# Patient Record
Sex: MALE | Race: OTHER | HISPANIC OR LATINO | URBAN - METROPOLITAN AREA
[De-identification: names, ages, dates, MRNs, and addresses within clinical notes are randomized per-mention and may not be internally consistent; named-entity substitution may affect disease eponyms.]

---

## 2022-08-24 ENCOUNTER — APPOINTMENT (RX ONLY)
Dept: URBAN - METROPOLITAN AREA CLINIC 15 | Facility: CLINIC | Age: 32
Setting detail: DERMATOLOGY
End: 2022-08-24

## 2022-08-24 VITALS — WEIGHT: 280 LBS | HEIGHT: 70 IN

## 2022-08-24 DIAGNOSIS — L30.1 DYSHIDROSIS [POMPHOLYX]: ICD-10-CM

## 2022-08-24 DIAGNOSIS — B35.3 TINEA PEDIS: ICD-10-CM

## 2022-08-24 DIAGNOSIS — L64.8 OTHER ANDROGENIC ALOPECIA: ICD-10-CM

## 2022-08-24 DIAGNOSIS — L30.8 OTHER SPECIFIED DERMATITIS: ICD-10-CM

## 2022-08-24 DIAGNOSIS — L65.0 TELOGEN EFFLUVIUM: ICD-10-CM

## 2022-08-24 PROCEDURE — ? PRESCRIPTION

## 2022-08-24 PROCEDURE — 99204 OFFICE O/P NEW MOD 45 MIN: CPT

## 2022-08-24 PROCEDURE — ? COUNSELING

## 2022-08-24 PROCEDURE — ? PRESCRIPTION MEDICATION MANAGEMENT

## 2022-08-24 RX ORDER — NAFTIFINE HYDROCHLORIDE 2 G/100G
GEL TOPICAL BID
Qty: 60 | Refills: 2 | Status: ERX | COMMUNITY
Start: 2022-08-24

## 2022-08-24 RX ORDER — BETAMETHASONE DIPROPIONATE 0.5 MG/G
1 CREAM TOPICAL AS DIRECTED
Qty: 67.5 | Refills: 0 | Status: ERX | COMMUNITY
Start: 2022-08-24

## 2022-08-24 RX ADMIN — BETAMETHASONE DIPROPIONATE 1: 0.5 CREAM TOPICAL at 00:00

## 2022-08-24 RX ADMIN — NAFTIFINE HYDROCHLORIDE: 2 GEL TOPICAL at 00:00

## 2022-08-24 ASSESSMENT — LOCATION DETAILED DESCRIPTION DERM
LOCATION DETAILED: RIGHT RADIAL PALM
LOCATION DETAILED: LEFT RADIAL PALM

## 2022-08-24 ASSESSMENT — LOCATION SIMPLE DESCRIPTION DERM
LOCATION SIMPLE: RIGHT HAND
LOCATION SIMPLE: LEFT HAND

## 2022-08-24 ASSESSMENT — LOCATION ZONE DERM: LOCATION ZONE: HAND

## 2022-08-24 NOTE — PROCEDURE: COUNSELING
Moisturizer Recommendations: Christy Melo from [employer]
Topical Steroid Recommendations: Diprolene ointment
Detail Level: Zone
Topical Steroid Recommendations: Triamcinolone 0.01% cream
Cleanser Recommendations: Vanicream brand
Moisturizer Recommendations: Cerave hand cream
Topical Antifungal Recommendations: Ketoconazole 2% cream
Minoxidil 5% Topical Foam Recommendations: Apply to scalp once a day
Minoxidil Recommendations: Rogaine 5% solution

## 2022-08-24 NOTE — PROCEDURE: PRESCRIPTION MEDICATION MANAGEMENT
Detail Level: Simple
Render In Strict Bullet Format?: No
Initiate Treatment: .\\n*Avoid excessive hand washing \\n*Use soap and water instead of sanitizers. Use Vanicream gentle cleanser \\n*Use moisturizer cream after hand washing.  Cerave hand cream
Initiate Treatment: .\\nbetamethasone dipropionate 0.05 % topical cream As directed. Apply to the hands twice a day for 2 weeks or until better.  use during flares only
Plan: .\\nDiscussed Finasteride tablets. Patient will consider in the future due to side effects . \\nDiscussed PRP injections 3-4 sections. $400 per treatment
Initiate Treatment: .\\nRogaine 5% solution or foam. Apply every night on the affected areas of the scalp \\n.

## 2022-08-24 NOTE — HPI: RASH (ECZEMA)
How Severe Is Your Eczema?: moderate
Is This A New Presentation, Or A Follow-Up?: Rash
Additional History: Patient is in office for dry, itchy hands and feet that flares up when patient is under stress.